# Patient Record
Sex: FEMALE | Race: BLACK OR AFRICAN AMERICAN
[De-identification: names, ages, dates, MRNs, and addresses within clinical notes are randomized per-mention and may not be internally consistent; named-entity substitution may affect disease eponyms.]

---

## 2017-07-13 ENCOUNTER — HOSPITAL ENCOUNTER (OUTPATIENT)
Dept: HOSPITAL 39 - RAD | Age: 32
Discharge: HOME | End: 2017-07-13
Attending: NURSE PRACTITIONER
Payer: COMMERCIAL

## 2017-07-13 DIAGNOSIS — M79.672: Primary | ICD-10-CM

## 2017-07-14 NOTE — RAD
EXAM DESCRIPTION: 



Foot,Left 3 Views



CLINICAL HISTORY: 



32 years, Female, PAIN



COMPARISON: 



None



TECHNIQUE: 



AP, lateral, and oblique views  of the left foot



FINDINGS: 



There is no bone, joint, or soft tissue abnormality observed.

There is no radiopaque foreign body. No fracture or dislocation

or stress reaction or periosteal response is seen.





IMPRESSION: 



Normal left foot three views

 



Electronically signed by:  Niko Benedict MD  7/14/2017 11:36 AM

CDT Workstation: [id number]

## 2017-08-05 ENCOUNTER — HOSPITAL ENCOUNTER (OUTPATIENT)
Dept: HOSPITAL 39 - YCFC.O | Age: 32
Discharge: HOME | End: 2017-08-05
Attending: NURSE PRACTITIONER
Payer: COMMERCIAL

## 2017-08-05 DIAGNOSIS — Z00.00: Primary | ICD-10-CM

## 2017-08-05 DIAGNOSIS — Z13.1: ICD-10-CM

## 2017-08-05 DIAGNOSIS — Z13.220: ICD-10-CM

## 2017-08-10 ENCOUNTER — HOSPITAL ENCOUNTER (OUTPATIENT)
Dept: HOSPITAL 39 - YCFC.O | Age: 32
Discharge: HOME | End: 2017-08-10
Attending: NURSE PRACTITIONER
Payer: COMMERCIAL

## 2017-08-10 DIAGNOSIS — R30.0: ICD-10-CM

## 2017-08-10 DIAGNOSIS — N94.0: Primary | ICD-10-CM

## 2018-11-05 ENCOUNTER — HOSPITAL ENCOUNTER (EMERGENCY)
Dept: HOSPITAL 39 - ER | Age: 33
Discharge: HOME | End: 2018-11-05
Payer: SELF-PAY

## 2018-11-05 VITALS — OXYGEN SATURATION: 96 % | SYSTOLIC BLOOD PRESSURE: 142 MMHG | DIASTOLIC BLOOD PRESSURE: 84 MMHG | TEMPERATURE: 98.6 F

## 2018-11-05 DIAGNOSIS — S93.402A: Primary | ICD-10-CM
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
## 2018-11-05 NOTE — RAD
EXAM: Ankle,Left 3 Views



CLINICAL INDICATION: 33-year-old female with painful ankle after

injury.



TECHNIQUE: Three views LEFT ankle were obtained in AP, lateral

and oblique projections.



COMPARISON: None.



FINDINGS: 

 There is no fracture or dislocation. The joint spaces are

preserved. No soft tissue abnormalities are seen. Small plantar

heel spur.



IMPRESSION: No acute radiographic abnormality. 



Electronically signed by:  Teresa Mcdaniel MD  11/5/2018 9:55 PM [facility]

Workstation: [id number]

## 2019-06-06 ENCOUNTER — HOSPITAL ENCOUNTER (EMERGENCY)
Dept: HOSPITAL 39 - ER | Age: 34
Discharge: HOME | End: 2019-06-06
Payer: SELF-PAY

## 2019-06-06 VITALS — DIASTOLIC BLOOD PRESSURE: 71 MMHG | SYSTOLIC BLOOD PRESSURE: 117 MMHG | TEMPERATURE: 102.8 F | OXYGEN SATURATION: 95 %

## 2019-06-06 DIAGNOSIS — J03.90: Primary | ICD-10-CM

## 2019-06-06 PROCEDURE — 87880 STREP A ASSAY W/OPTIC: CPT

## 2019-06-06 PROCEDURE — 87070 CULTURE OTHR SPECIMN AEROBIC: CPT

## 2019-06-06 NOTE — ED.PDOC
History of Present Illness





- General


Chief Complaint: Fever


Stated Complaint: fever, sore throat, ear pain


Time Seen by Provider: 06/06/19 02:08


Source: patient


Exam Limitations: no limitations





- History of Present Illness


Initial Comments: 





Pt had onset of sorethroat yesterday morning with fever and myalgias. Denies URI

sx's


Timing/Duration: yesterday


Fever Severity/Quality: greater than 102 F


Fever Therapy PTA: Tylenol


Associated Symptoms: sore throat





Review of Systems





- Review of Systems


Constitutional: States: chills, fever


EENTM: States: throat pain, throat swelling.  Denies: nose congestion


Respiratory: Denies: cough, short of breath


Cardiology: Denies: chest pain


Gastrointestinal/Abdominal: Denies: abdominal pain, nausea, vomiting


Genitourinary: States: no symptoms reported


Musculoskeletal: States: muscle pain


Skin: Denies: rash


Neurological: States: headache





Past Medical History (General)





- Patient Medical History


Hx Seizures: No


Hx Stroke: No


Hx Dementia: No


Hx of COPD: No


Hx Cardiac Disorders: No


Hx Congestive Heart Failure: No


Hx Pacemaker: No


Hx Hypertension: No


Hx Thyroid Disease: No


Hx Diabetes: No


Hx Gastroesophageal Reflux: No


Hx Renal Disease: No


Hx Cancer: No


Hx of HIV: No


Hx Hepatitis C: No


Hx MRSA: No


Surgical History: other





- Vaccination History


Hx Tetanus, Diphtheria Vaccination: Yes


Hx Influenza Vaccination: No


Hx Pneumococcal Vaccination: No


Immunizations Up to Date: No





- Social History


Hx Tobacco Use: No


Hx Alcohol Use: Yes - occ


Hx Substance Use: No





- Female History


Hx Last Menstrual Period: 03/02/12


Patient Pregnant: No


Expected Date of Delivery:: 12/07/12





Family Medical History





- Family History


  ** Father


Family History: Unknown





Physical Exam





- Physical Exam


General Appearance: Agitated, Obvious distress


Eye Exam: bilateral normal


ENT Exam: TMs normal, pharyngeal erythema, tonsillar exudate


Neck: lymphadenopathy (R)


Respiratory: normal breath sounds, no respiratory distress


Cardiovascular/Chest: normal peripheral pulses





Departure





- Departure


Clinical Impression: 


 Exudative tonsillitis





Disposition: Discharge to Home or Self Care


Condition: Fair


Departure Forms:  ED Discharge - Pt. Copy, Patient Portal Self Enrollment


Referrals: 


Luly Rios NP [Primary Care Provider] - 1-2 Weeks


Prescriptions: 


Tramadol HCl 50 mg PO Q4HR PRN #15 tab


 PRN Reason: Moderate To Severe Pain


Amoxicillin & Pot Clavulanate [Augmentin Tab] 875 mg PO BID #20 tab


Home Medications: 


Ambulatory Orders





Indomethacin [Indomethacin ER] 75 mg PO BID #20 cap 11/05/18 


Amoxicillin & Pot Clavulanate [Augmentin Tab] 875 mg PO BID #20 tab 06/06/19 


Tramadol HCl 50 mg PO Q4HR PRN #15 tab 06/06/19

## 2019-09-09 ENCOUNTER — HOSPITAL ENCOUNTER (EMERGENCY)
Dept: HOSPITAL 39 - ER | Age: 34
Discharge: HOME | End: 2019-09-09
Payer: MEDICAID

## 2019-09-09 VITALS — OXYGEN SATURATION: 97 % | TEMPERATURE: 97.5 F | SYSTOLIC BLOOD PRESSURE: 139 MMHG | DIASTOLIC BLOOD PRESSURE: 85 MMHG

## 2019-09-09 DIAGNOSIS — M62.838: Primary | ICD-10-CM

## 2019-09-09 DIAGNOSIS — E66.9: ICD-10-CM

## 2019-09-09 RX ADMIN — CYCLOBENZAPRINE HYDROCHLORIDE ONE MG: 10 TABLET, FILM COATED ORAL at 09:56

## 2019-09-09 NOTE — ED.PDOC
History of Present Illness





- General


Chief Complaint: General


Stated Complaint: Head,neck,shoulder discomfort


Time Seen by Provider: 09/09/19 09:35


Source: patient


Exam Limitations: no limitations





- History of Present Illness


Initial Comments: 





the patient is a 34-year-old female presenting to emergency room secondary to 2 

hours of discomfort in the left lateral neck.  She is having a little bit of 

tingling sensation and some muscle spasm extending from the posterior left 

occipital area down towards the left shoulder posteriorly.  She has some obvious

muscle spasm and trapezius muscle and tenderness to palpation in that area as 

well.  No altered mental status.  No focal neurological changes.  She is very 

anxious. She moves the left shoulder well. No nuchal rigidity.  No fever.  No 

meningeal signs.  No rash.


Timing/Duration: 1-3 hours


Severity: moderate


Improving Factors: medication - Motrin seemed to help some


Worsening Factors: movement


Associated Symptoms: denies symptoms


Allergies/Adverse Reactions: 


Allergies





NO KNOWN ALLERGY Allergy (Verified 01/02/13 15:32)


   





Home Medications: 


Ambulatory Orders





Cyclobenzaprine HCl [Flexeril] 5 mg PO TID PRN #30 tab 09/09/19 


predniSONE [Prednisone] 20 mg PO DAILY #5 tab 09/09/19 











Review of Systems





- Review of Systems


Constitutional: States: no symptoms reported


EENTM: States: no symptoms reported


Respiratory: States: no symptoms reported


Cardiology: States: no symptoms reported


Gastrointestinal/Abdominal: States: no symptoms reported


Genitourinary: States: no symptoms reported


Musculoskeletal: States: see HPI


Skin: States: no symptoms reported


Neurological: States: see HPI, anxiety


Endocrine: States: no symptoms reported


All other Systems: No Change from Baseline





Past Medical History (General)





- Patient Medical History


Hx Seizures: No


Hx Stroke: No


Hx Dementia: No


Hx of COPD: No


Hx Cardiac Disorders: No


Hx Congestive Heart Failure: No


Hx Pacemaker: No


Hx Hypertension: No


Hx Thyroid Disease: No


Hx Diabetes: No


Hx Gastroesophageal Reflux: No


Hx Renal Disease: No


Hx Cancer: No


Hx of HIV: No


Hx Hepatitis C: No


Hx MRSA: No


Surgical History: other





- Vaccination History


Hx Tetanus, Diphtheria Vaccination: Yes


Hx Influenza Vaccination: No


Hx Pneumococcal Vaccination: No





- Social History


Hx Tobacco Use: No


Hx Alcohol Use: Yes - Social


Hx Substance Use: No





- Female History


Patient is a Female of Child Bearing Age (10 -59 yrs old): Yes


Hx Last Menstrual Period: 03/02/12


Patient Pregnant: No


Expected Date of Delivery:: 12/07/12





Family Medical History





- Family History


  ** Father


Family History: Unknown





Physical Exam





- Physical Exam


General Appearance: Alert, Anxious, No apparent distress


Eye Exam: bilateral normal


Ears, Nose, Throat: hearing grossly normal, normal pharynx


Neck: full range of motion, other - some obvious mild muscle spasm to the left 

trapezius muscle


Respiratory: lungs clear, normal breath sounds, no respiratory distress, no 

accessory muscle use


Cardiovascular/Chest: normal peripheral pulses, no edema


Peripheral Pulses: radial,right: 2+, radial,left: 2+


Gastrointestinal/Abdominal: other - obese


Rectal Exam: deferred


Back Exam: no CVA tenderness, no vertebral tenderness


Extremity: normal range of motion, non-tender, normal inspection, no pedal 

edema, normal capillary refill


Neurologic: CNs II-XII nml as tested, alert, normal mood/affect, oriented x 3


Skin Exam: normal color


Comments: 





                               Vital Signs - 24 hr











  09/09/19





  09:35


 


Temperature 98.2 F


 


Pulse Rate [ 81





Left Radial] 


 


Respiratory 18





Rate 


 


Blood Pressure 129/85





[Left Arm] 


 


O2 Sat by Pulse 95





Oximetry 














Progress





- Progress


Progress: 





09/09/19 09:58


the patient's 34-year-old female presenting to emergency room with what appears 

to be pain secondary to mild left superior trapezius muscle spasm.  I'm uncertai

n if she strained the muscle or if this is due to some nerve irritation to the 

muscle.  I see no evidence of any other injury.  No obvious infection.  No 

evidence of any rash at the site.  The patient is going to be placed on 

prednisone for 5 days at 20 mg daily and she'll be written for Flexeril as a 

muscle relaxer.  she does need to be careful because this can make her drowsy.  

She can continue to take either ibuprofen or Aleve with food to help reduce 

discomfort.  She does need to do stretches for the muscle.  Also topical heat in

the form of icy hot, Biofreeze or heat pad may also help.  She can follow up 

with her primary care doctor later this week.  ER warnings were given.





[id number] [id number] [phone]





Departure





- Departure


Clinical Impression: 


 Trapezius muscle spasm





Disposition: Discharge to Home or Self Care


Condition: Fair


Departure Forms:  ED Discharge - Pt. Copy, Patient Portal Self Enrollment


Instructions:  Muscle Spasms (DC)


Diet: regular diet


Activity: increase activity as tolerated


Referrals: 


Luly Rios NP [Primary Care Provider] - 1-2 Weeks


Prescriptions: 


Cyclobenzaprine HCl [Flexeril] 5 mg PO TID PRN #30 tab


 PRN Reason: Muscle Spasms


predniSONE [Prednisone] 20 mg PO DAILY #5 tab


Home Medications: 


Ambulatory Orders





Cyclobenzaprine HCl [Flexeril] 5 mg PO TID PRN #30 tab 09/09/19 


predniSONE [Prednisone] 20 mg PO DAILY #5 tab 09/09/19 








Additional Instructions: 


the patient's 34-year-old female presenting to emergency room with what appears 

to be pain secondary to mild left superior trapezius muscle spasm.  I'm 

uncertain if she strained the muscle or if this is due to some nerve irritation 

to the muscle.  I see no evidence of any other injury.  No obvious infection.  

No evidence of any rash at the site.  The patient is going to be placed on 

prednisone for 5 days at 20 mg daily and she'll be written for Flexeril as a 

muscle relaxer.  she does need to be careful because this can make her drowsy.  

She can continue to take either ibuprofen or Aleve with food to help reduce 

discomfort.  She does need to do stretches for the muscle.  Also topical heat in

 the form of icy hot, Biofreeze or heat pad may also help.  She can follow up 

with her primary care doctor later this week.  ER warnings were given.

## 2020-01-02 ENCOUNTER — HOSPITAL ENCOUNTER (OUTPATIENT)
Dept: HOSPITAL 39 - RAD | Age: 35
End: 2020-01-02
Attending: NURSE PRACTITIONER
Payer: MEDICAID

## 2020-01-02 DIAGNOSIS — R05: Primary | ICD-10-CM

## 2020-01-02 NOTE — RAD
EXAM DESCRIPTION: Chest,2 Views



CLINICAL HISTORY: COUGH



COMPARISON: None



TECHNIQUE: PA/lateral



FINDINGS: There is no acute appearing cardiac or pulmonary

abnormality. Heart size is normal with normal pulmonary

vascularity. No pleural effusion or pneumothorax. Lungs are clear

with no consolidating infiltrate. Lateral view shows intact

sternum and T-spine.



IMPRESSION:



No acute process is identified in the chest.



Electronically signed by:  Steven Carrington MD  1/2/2020 11:48

AM CST Workstation: [id number]

## 2020-05-04 ENCOUNTER — HOSPITAL ENCOUNTER (OUTPATIENT)
Dept: HOSPITAL 39 - YCFC.O | Age: 35
End: 2020-05-04
Attending: NURSE PRACTITIONER
Payer: MEDICAID

## 2020-05-04 DIAGNOSIS — E28.1: ICD-10-CM

## 2020-05-04 DIAGNOSIS — N91.2: Primary | ICD-10-CM

## 2020-06-04 ENCOUNTER — HOSPITAL ENCOUNTER (OUTPATIENT)
Dept: HOSPITAL 39 - RAD | Age: 35
End: 2020-06-04
Attending: NURSE PRACTITIONER
Payer: COMMERCIAL

## 2020-06-04 DIAGNOSIS — M77.31: Primary | ICD-10-CM

## 2020-06-05 NOTE — RAD
EXAM: Foot,Right 3 Views



CLINICAL HISTORY: PAIN IN RIGHT FOOT.



TECHNIQUE: AP, lateral and oblique x-rays of the right foot.



COMPARISON STUDY:  None

 

FINDINGS: Bone structures and joint spaces appear normal.    No

fracture or dislocation identified.   5 mm plantar heel spurs

present.

 

IMPRESSION:  

1. No acute abnormality.

2. 5 mm plantar heel spur.



Electronically signed by:  Smith Gan MD  6/5/2020 7:46 AM CDT

Workstation: [id number]

## 2021-01-22 ENCOUNTER — HOSPITAL ENCOUNTER (OUTPATIENT)
Dept: HOSPITAL 39 - YCFC.O | Age: 36
End: 2021-01-22
Attending: NURSE PRACTITIONER
Payer: COMMERCIAL

## 2021-01-22 DIAGNOSIS — Z20.828: Primary | ICD-10-CM

## 2022-01-24 NOTE — ED.PDOC
History of Present Illness





- General


Chief Complaint: Lower Extremity Injury


Stated Complaint: Left ankle pain


Time Seen by Provider: 11/05/18 21:40


Source: patient





- History of Present Illness


Initial Comments: 





Twisted ankle 10 days ago that is causing more pain up lower leg


Occurred: other - 10 days


Pain - Lower Extremity: moderate: Left Ankle


Method of Injury: twisted


Improving Factors: immobilization


Worsening Factors: movement


Allergies/Adverse Reactions: 


Allergies





NO KNOWN ALLERGY Allergy (Verified 01/02/13 15:32)


 








Home Medications: 


Ambulatory Orders





Indomethacin [Indomethacin ER] 75 mg PO BID #20 cap 11/05/18 











Review of Systems





- Review of Systems


Constitutional: States: no symptoms reported


Musculoskeletal: States: joint pain - L ankle


Skin: Denies: change in color, lesions, rash


Neurological: States: no symptoms reported





Past Medical History (General)





- Patient Medical History


Hx Diabetes: No


Hx Renal Disease: No





- Vaccination History


Hx Influenza Vaccination: No





- Social History


Hx Substance Use: No





- Female History


Hx Last Menstrual Period: 03/02/12


Patient Pregnant: No


Expected Date of Delivery:: 12/07/12





- Triage Comment


ED Triage Comment: pain to left ankle, radiates up leg, injured 1-2 weeks ago.





Family Medical History





- Family History


  ** Father


Family History: Unknown





Physical Exam





- Physical Exam


General Appearance: Alert


Thigh/Hip: normal inspection, non-tender


Leg: soft tissue tenderness - distal


Knee: normal inspection, non-tender


Ankle: normal inspection, limited ROM, pain


Foot: normal inspection, non-tender, no evidence of injury


Neuro/Tendon: normal sensation


Mental Status: alert, oriented x 3


Skin: normal color, warm/dry





Departure





- Departure


Clinical Impression: 


 Sprain of left ankle or foot





Disposition: Discharge to Home or Self Care


Departure Forms:  ED Discharge - Pt. Copy, Patient Portal Self Enrollment


Instructions:  DI for Leg Pain


Referrals: 


Luly Rios NP [Primary Care Provider] - 1-2 Weeks


Prescriptions: 


Indomethacin [Indomethacin ER] 75 mg PO BID #20 cap


Home Medications: 


Ambulatory Orders





Indomethacin [Indomethacin ER] 75 mg PO BID #20 cap 11/05/18 no